# Patient Record
Sex: FEMALE | Race: BLACK OR AFRICAN AMERICAN | NOT HISPANIC OR LATINO | Employment: OTHER | URBAN - METROPOLITAN AREA
[De-identification: names, ages, dates, MRNs, and addresses within clinical notes are randomized per-mention and may not be internally consistent; named-entity substitution may affect disease eponyms.]

---

## 2017-09-05 ENCOUNTER — OFFICE VISIT (OUTPATIENT)
Dept: ORTHOPEDICS | Facility: CLINIC | Age: 67
End: 2017-09-05

## 2017-09-05 VITALS
SYSTOLIC BLOOD PRESSURE: 143 MMHG | HEIGHT: 62 IN | DIASTOLIC BLOOD PRESSURE: 77 MMHG | WEIGHT: 204.2 LBS | HEART RATE: 72 BPM | BODY MASS INDEX: 37.58 KG/M2

## 2017-09-05 DIAGNOSIS — M25.562 LEFT KNEE PAIN, UNSPECIFIED CHRONICITY: Primary | ICD-10-CM

## 2017-09-05 DIAGNOSIS — M17.12 PRIMARY OSTEOARTHRITIS OF LEFT KNEE: ICD-10-CM

## 2017-09-05 NOTE — LETTER
"  9/5/2017      RE: Tricia Dumas  [address] [place] [address]       Sports Medicine Clinic Visit    PCP: Jesica Grossman    Tricia Dumas is a 67 year old female who is seen  in consultation at the request of PCP Clinic presenting with Left knee pain .  Pt is on Coreg, Cozaar and Coumadin.  H/O nephrotic syndrome.  Previous workup, including workup including SHYANNE, dsDNA, ANCA, complement levels, workup for hemolysis, including LDH, reticulocyte count and peripheral blood smear were negative.  DM, HTN.  H/o  low back pain in the past. She was diagnosed with antiphospholipid antibody syndrome about 4 years back when she had a hysterectomy and developed a DVT and PE and has been on coumadin since then    Injury: N/A    Location of Pain: left knee  Duration of Pain: 3 year(s)  Rating of Pain: 5/10  Pain is better with: Nothing  Pain is worse with: Walking   Additional Features: N/A  Treatment so far consists of: Ice and Heat  Prior History of related problems: N/A    /77  Pulse 72  Ht 5' 1.5\" (1.562 m)  Wt 204 lb 3.2 oz (92.6 kg)  BMI 37.96 kg/m2       PMH:  Past Medical History:   Diagnosis Date     Hypertension      Nonsenile cataract      Type 2 diabetes mellitus without complications (H)        Active problem list:  Patient Active Problem List   Diagnosis     Essential hypertension     Microhematuria     Flank pain     Nephrotic syndrome     Proteinuria     Diabetes mellitus type 2, uncomplicated (H)     Hyperlipidemia       FH:  Family History   Problem Relation Age of Onset     Glaucoma No family hx of      Macular Degeneration No family hx of        SH:  Social History     Social History     Marital status:      Spouse name: N/A     Number of children: N/A     Years of education: N/A     Occupational History     Not on file.     Social History Main Topics     Smoking status: Never Smoker     Smokeless tobacco: Not on file     Alcohol use Not on file     Drug use: Not on file     Sexual " "activity: Not on file     Other Topics Concern     Not on file     Social History Narrative       MEDS:  See EMR, reviewed  ALL:  See EMR, reviewed    REVIEW OF SYSTEMS:  CONSTITUTIONAL:NEGATIVE for fever, chills, change in weight  INTEGUMENTARY/SKIN: NEGATIVE for worrisome rashes, moles or lesions  EYES: NEGATIVE for vision changes or irritation  ENT/MOUTH: NEGATIVE for ear, mouth and throat problems  RESP:NEGATIVE for significant cough or SOB  BREAST: NEGATIVE for masses, tenderness or discharge  CV: NEGATIVE for chest pain, palpitations or peripheral edema  GI: NEGATIVE for nausea, abdominal pain, heartburn, or change in bowel habits  :NEGATIVE for frequency, dysuria, or hematuria  :NEGATIVE for frequency, dysuria, or hematuria  NEURO: NEGATIVE for weakness, dizziness or paresthesias  ENDOCRINE: NEGATIVE for temperature intolerance, skin/hair changes  HEME/ALLERGY/IMMUNE: NEGATIVE for bleeding problems  PSYCHIATRIC: NEGATIVE for changes in mood or affect        Subjective: This 67-year-old native  female is seen with her family member and an  for chronic left-sided knee discomfort. It bothers her with weightbearing. This bothered her for nearly 2 years. She points to the medial and lateral aspects of the left knee as the area that gives her discomfort. It does not lock or catch. She indicates that 3 months ago and x-ray and MRI of the knee were done at the [facility].  She believes the results of the MRI were sent to her primary provider at [facility] clinic.  Unfortunately I do not have any of these results results today. She indicates that she was told that there was \"torn cartilage\" within the knee. She was told that she should discuss it with a knee specialist.    Objective: Above ideal weight. Normal range of motion the left hip to internal rotation and external rotation and abduction. There is no effusion of the left knee. I can flex and extend the knee fully. Nontender over the medial or " lateral joint line. Nontender over the patellar tendon representing bursa. She could do an active straight leg raise. Nontender over the distal IT band. Distal pulses and sensation are intact. Strength is intact to dorsiflexion of the foot and eversion at the foot. Active straight leg raise is negative. Overlying skin is normal. Appropriate in conversation and affect.      X-rays of the left knee today show medial joint space narrowing and peripheral spurring of a moderate degree.    Assessment: Left knee DJD.    Plan: We will try to get copies of her MRI report from [facility].  In the meantime we discussed conservative cares for degenerative arthritis of the knee and we went over x-rays in detail. She would like to avoid oral pain prescription medicines because of her history of kidney disease. She would like to avoid a cortisone or Synvisc injection for now. She knows is an option for the future if she changes her mind. She is considering having custom medial  brace made by orthotics but wants to avoid it for now. She does not want any interventions today but wants to think about her options. I told her would be best to avoid surgery in the current circumstances. There is no indication for knee replacement at this time. She would like to avoid surgery regardless.    Jhonny Terry MD

## 2017-09-05 NOTE — MR AVS SNAPSHOT
"              After Visit Summary   9/5/2017    Tricia Dumas    MRN: [id number]           Patient Information     Date Of Birth          1950        Visit Information        Provider Department      9/5/2017 8:30 AM Jhonny Terry MD; [employer] [facility] Sports Medicine        Today's Diagnoses     Left knee pain, unspecified chronicity    -  1    Primary osteoarthritis of left knee           Follow-ups after your visit        Who to contact     Please call your clinic at [phone] to:    Ask questions about your health    Make or cancel appointments    Discuss your medicines    Learn about your test results    Speak to your doctor   If you have compliments or concerns about an experience at your clinic, or if you wish to file a complaint, please contact [facility] Patient Relations at [phone] or email us at [email or web address]         Additional Information About Your Visit        MyChart Information     [employer] gives you secure access to your electronic health record. If you see a primary care provider, you can also send messages to your care team and make appointments. If you have questions, please call your primary care clinic.  If you do not have a primary care provider, please call [phone] and they will assist you.      [employer] is an electronic gateway that provides easy, online access to your medical records. With [employer], you can request a clinic appointment, read your test results, renew a prescription or communicate with your care team.     To access your existing account, please contact your [facility] or call [phone] for assistance.        Care EveryWhere ID     This is your Care EveryWhere ID. This could be used by other organizations to access your [place] medical records  [id number]        Your Vitals Were     Pulse Height BMI (Body Mass Index)             72 5' 1.5\" (1.562 m) 37.96 kg/m2   "        Blood Pressure from Last 3 Encounters:   09/05/17 143/77   02/26/16 145/81   01/22/16 143/73    Weight from Last 3 Encounters:   09/05/17 204 lb 3.2 oz (92.6 kg)   02/26/16 199 lb 8.3 oz (90.5 kg)   01/22/16 201 lb (91.2 kg)               Primary Care Provider Office Phone # Fax #    Jesica Grossman [phone]       [facility] [address]  [facility] [address]        Equal Access to Services     [facility] : Hadii triston mercado hadasho Sonoris, waaxda luqadaha, qaybta kaalmada frank, david davis . [facility] [phone].    ATENCIÓN: Si habla español, tiene a andres disposición servicios gratuitos de asistencia lingüística. [facility] [phone].    We comply with applicable federal civil rights laws and [place] laws. We do not discriminate on the basis of race, color, national origin, age, disability sex, sexual orientation or gender identity.            Thank you!     Thank you for choosing [facility]  for your care. Our goal is always to provide you with excellent care. Hearing back from our patients is one way we can continue to improve our services. Please take a few minutes to complete the written survey that you may receive in the mail after your visit with us. Thank you!             Your Updated Medication List - Protect others around you: Learn how to safely use, store and throw away your medicines at www.disposemymeds.org.          This list is accurate as of: 9/5/17  9:39 AM.  Always use your most recent med list.                   Brand Name Dispense Instructions for use Diagnosis    acetaminophen 500 MG tablet    TYLENOL     Take 1 tablet by mouth every 6 hours as needed.        calcium-vitamin D 600-400 MG-UNIT per tablet    CALTRATE     Take 1 tablet by mouth 2 times daily        carvedilol 12.5 MG tablet    COREG    60 tablet    Take 1 tablet (12.5 mg) by mouth 2 times daily    Hypertension       ketoconazole 2 % cream    NIZORAL      Apply topically daily        losartan 100 MG tablet    COZAAR    90 tablet    Take 1 tablet (100 mg) by mouth daily    Benign essential hypertension       omeprazole 20 MG CR capsule    priLOSEC    90 capsule    Take 2 capsules by mouth daily.    Esophageal reflux, Unspecified essential hypertension, Skin abnormalities, Myalgia, Flank pain       rosuvastatin 10 MG tablet    CRESTOR    60 tablet    Take 1 tablet (10 mg) by mouth daily    Hyperlipidemia LDL goal <100       warfarin 2.5 MG tablet    COUMADIN    90 tablet    Take 2.5 mg by mouth daily.    Esophageal reflux, Unspecified essential hypertension, Skin abnormalities, Myalgia, Flank pain

## 2017-09-05 NOTE — PROGRESS NOTES
"Sports Medicine Clinic Visit    PCP: Jesica Grossman    Tricia Dumas is a 67 year old female who is seen  in consultation at the request of PCP Clinic presenting with Left knee pain .  Pt is on Coreg, Cozaar and Coumadin.  H/O nephrotic syndrome.  Previous workup, including workup including SHYANNE, dsDNA, ANCA, complement levels, workup for hemolysis, including LDH, reticulocyte count and peripheral blood smear were negative.  DM, HTN.  H/o  low back pain in the past. She was diagnosed with antiphospholipid antibody syndrome about 4 years back when she had a hysterectomy and developed a DVT and PE and has been on coumadin since then    Injury: N/A    Location of Pain: left knee  Duration of Pain: 3 year(s)  Rating of Pain: 5/10  Pain is better with: Nothing  Pain is worse with: Walking   Additional Features: N/A  Treatment so far consists of: Ice and Heat  Prior History of related problems: N/A    /77  Pulse 72  Ht 5' 1.5\" (1.562 m)  Wt 204 lb 3.2 oz (92.6 kg)  BMI 37.96 kg/m2       PMH:  Past Medical History:   Diagnosis Date     Hypertension      Nonsenile cataract      Type 2 diabetes mellitus without complications (H)        Active problem list:  Patient Active Problem List   Diagnosis     Essential hypertension     Microhematuria     Flank pain     Nephrotic syndrome     Proteinuria     Diabetes mellitus type 2, uncomplicated (H)     Hyperlipidemia       FH:  Family History   Problem Relation Age of Onset     Glaucoma No family hx of      Macular Degeneration No family hx of        SH:  Social History     Social History     Marital status:      Spouse name: N/A     Number of children: N/A     Years of education: N/A     Occupational History     Not on file.     Social History Main Topics     Smoking status: Never Smoker     Smokeless tobacco: Not on file     Alcohol use Not on file     Drug use: Not on file     Sexual activity: Not on file     Other Topics Concern     Not on file     Social History " "Narrative       MEDS:  See EMR, reviewed  ALL:  See EMR, reviewed    REVIEW OF SYSTEMS:  CONSTITUTIONAL:NEGATIVE for fever, chills, change in weight  INTEGUMENTARY/SKIN: NEGATIVE for worrisome rashes, moles or lesions  EYES: NEGATIVE for vision changes or irritation  ENT/MOUTH: NEGATIVE for ear, mouth and throat problems  RESP:NEGATIVE for significant cough or SOB  BREAST: NEGATIVE for masses, tenderness or discharge  CV: NEGATIVE for chest pain, palpitations or peripheral edema  GI: NEGATIVE for nausea, abdominal pain, heartburn, or change in bowel habits  :NEGATIVE for frequency, dysuria, or hematuria  :NEGATIVE for frequency, dysuria, or hematuria  NEURO: NEGATIVE for weakness, dizziness or paresthesias  ENDOCRINE: NEGATIVE for temperature intolerance, skin/hair changes  HEME/ALLERGY/IMMUNE: NEGATIVE for bleeding problems  PSYCHIATRIC: NEGATIVE for changes in mood or affect        Subjective: This 67-year-old native  female is seen with her family member and an  for chronic left-sided knee discomfort. It bothers her with weightbearing. This bothered her for nearly 2 years. She points to the medial and lateral aspects of the left knee as the area that gives her discomfort. It does not lock or catch. She indicates that 3 months ago and x-ray and MRI of the knee were done at the [facility].  She believes the results of the MRI were sent to her primary provider at [facility] clinic.  Unfortunately I do not have any of these results results today. She indicates that she was told that there was \"torn cartilage\" within the knee. She was told that she should discuss it with a knee specialist.    Objective: Above ideal weight. Normal range of motion the left hip to internal rotation and external rotation and abduction. There is no effusion of the left knee. I can flex and extend the knee fully. Nontender over the medial or lateral joint line. Nontender over the patellar tendon representing bursa. She " could do an active straight leg raise. Nontender over the distal IT band. Distal pulses and sensation are intact. Strength is intact to dorsiflexion of the foot and eversion at the foot. Active straight leg raise is negative. Overlying skin is normal. Appropriate in conversation and affect.      X-rays of the left knee today show medial joint space narrowing and peripheral spurring of a moderate degree.    Assessment: Left knee DJD.    Plan: We will try to get copies of her MRI report from [facility].  In the meantime we discussed conservative cares for degenerative arthritis of the knee and we went over x-rays in detail. She would like to avoid oral pain prescription medicines because of her history of kidney disease. She would like to avoid a cortisone or Synvisc injection for now. She knows is an option for the future if she changes her mind. She is considering having custom medial  brace made by orthotics but wants to avoid it for now. She does not want any interventions today but wants to think about her options. I told her would be best to avoid surgery in the current circumstances. There is no indication for knee replacement at this time. She would like to avoid surgery regardless.

## 2020-03-02 ENCOUNTER — HEALTH MAINTENANCE LETTER (OUTPATIENT)
Age: 70
End: 2020-03-02

## 2020-12-14 ENCOUNTER — HEALTH MAINTENANCE LETTER (OUTPATIENT)
Age: 70
End: 2020-12-14

## 2021-01-26 ENCOUNTER — TRANSCRIBE ORDERS (OUTPATIENT)
Dept: OTHER | Age: 71
End: 2021-01-26

## 2021-01-26 DIAGNOSIS — N02.2 MEMBRANOUS NEPHROPATHY DETERMINED BY BIOPSY: Primary | ICD-10-CM

## 2021-01-27 ENCOUNTER — APPOINTMENT (OUTPATIENT)
Dept: INTERPRETER SERVICES | Facility: CLINIC | Age: 71
End: 2021-01-27
Payer: COMMERCIAL

## 2021-01-27 NOTE — TELEPHONE ENCOUNTER
RECORDS RECEIVED FROM:    DATE RECEIVED: 03.25.2021   NOTES STATUS DETAILS   OFFICE NOTE from referring provider N/A    OFFICE NOTE from other specialist  Care Everywhere 01.25.2021 Jesica Grossman MD      01.14.2021 [facility]  MD DEVON      01.12.2021 Afsaneh Gutierrez MD  [facility]     *Only VASCULITIS or LUPUS gather office notes for the following N/A    *PULMONARY   N/A    *ENT N/A    *DERMATOLOGY N/A    *RHEUMATOLOGY N/A    DISCHARGE SUMMARY from hospital N/A    DISCHARGE REPORT from the ER N/A    MEDICATION LIST Care Everywhere    IMAGING  (NEED IMAGES AND REPORTS)     KIDNEY CT SCAN N/A    KIDNEY ULTRASOUND Care Everywhere 11.19.2020 US Abd Complete     MR ABDOMEN N/A    NUCLEAR MEDICINE RENAL N/A    LABS     CBC Care Everywhere 12.10.2020   CMP Care Everywhere 12.10.2020   BMP N/A    UA Care Everywhere 01.07.2021   URINE PROTEIN Care Everywhere 01.07.2021   RENAL PANEL N/A    BIOPSY     KIDNEY BIOPSY  N/A       Action 02.17.2021    Action Taken Called [employer] to get image pushed over, called [phone], image updated to chart.

## 2021-02-22 ENCOUNTER — DOCUMENTATION ONLY (OUTPATIENT)
Dept: CARE COORDINATION | Facility: CLINIC | Age: 71
End: 2021-02-22

## 2021-03-25 ENCOUNTER — VIRTUAL VISIT (OUTPATIENT)
Dept: NEPHROLOGY | Facility: CLINIC | Age: 71
End: 2021-03-25
Attending: INTERNAL MEDICINE
Payer: COMMERCIAL

## 2021-03-25 ENCOUNTER — PRE VISIT (OUTPATIENT)
Dept: NEPHROLOGY | Facility: CLINIC | Age: 71
End: 2021-03-25

## 2021-03-25 DIAGNOSIS — E78.5 HYPERLIPIDEMIA LDL GOAL <100: ICD-10-CM

## 2021-03-25 DIAGNOSIS — N02.2 MEMBRANOUS NEPHROPATHY DETERMINED BY BIOPSY: Primary | ICD-10-CM

## 2021-03-25 PROCEDURE — 99205 OFFICE O/P NEW HI 60 MIN: CPT | Mod: 95 | Performed by: INTERNAL MEDICINE

## 2021-03-25 RX ORDER — GLIPIZIDE 5 MG/1
5 TABLET ORAL 2 TIMES DAILY
COMMUNITY

## 2021-03-25 RX ORDER — HYDROCHLOROTHIAZIDE 25 MG/1
25 TABLET ORAL DAILY
COMMUNITY

## 2021-03-25 RX ORDER — ROSUVASTATIN CALCIUM 5 MG/1
5 TABLET, COATED ORAL DAILY
Qty: 30 TABLET | Refills: 11 | Status: SHIPPED | OUTPATIENT
Start: 2021-03-25

## 2021-03-25 SDOH — HEALTH STABILITY: MENTAL HEALTH: HOW OFTEN DO YOU HAVE A DRINK CONTAINING ALCOHOL?: NEVER

## 2021-03-25 SDOH — HEALTH STABILITY: MENTAL HEALTH: HOW OFTEN DO YOU HAVE 6 OR MORE DRINKS ON ONE OCCASION?: NEVER

## 2021-03-25 SDOH — HEALTH STABILITY: MENTAL HEALTH: HOW MANY STANDARD DRINKS CONTAINING ALCOHOL DO YOU HAVE ON A TYPICAL DAY?: NOT ASKED

## 2021-03-25 ASSESSMENT — PAIN SCALES - GENERAL: PAINLEVEL: NO PAIN (0)

## 2021-03-25 NOTE — PROGRESS NOTES
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  Last seen by him in March 2016.  She has a history of nephrotic syndrome, with UPCR ~ 5 g/g.  She had a serologic work up including SHYANNE, dsDNA, ANCA, anti-GBM complements, and work up for hemolysis which were negative.     Her anti-PLA2r came back positive at 1:160.       She has a history of diabetes, but it was apparently diet controlled.    IN the interval since March 2016:  - She was initially hesitant to undergo a kidney biopsy, but it eventually had a CT guided biosy on 9/27/2016 at [facility], the result of which was consistent with membranous nephropathy (see report below).   - Serum and Urine ELP and urine IFX were neg in 12/22/2020.  - SHe has been followed by Dr Jaguar Astudillo, and her course is summarized as follows:  [...]'She was treated briefly with CSA and prednisone, but did not tolerate this and she stopped the medications on her own. Her BS's were very high. She was not happy with her care and came to see Dr. Matthews here, had spontaneous remission of proteinuria despite being off medications beside losartan.   I saw her in 2019 and she was doing well, had minimal proteinuria, BP controlled on losartan 50mg per day, and we planned yearly follow-up.   In December of 2019 she developed some L low back pain. She had labs taken by PCP, Cr remained normal but UA was positive for protein and I was notified. We got additional labs, found that her urine Pr:Cr was up to 1 g/g and she reported higher BP's. PLA2R was positive at 44. We increased losartan to 100 mg/day. Her last urine Pr:Cr " "was then 1.6 g/g on HCTZ 25mg and losartan 100mg daily.  [...]\"  Regarding her membranous nephropathy, her Cr continues to be normal, her PLA2R has edgar stable overall and was 53 in Dec 2020 from 61 in Sept 2020 and her urine Pr:Cr was 1.6 g/g this month compared to 3.47 g/g last month and this is lower than it has been in quite some time. \"    Today, She is seen by video, she is accompanied by her daughter who is fluent in English.  She is seeking a second opinion.  She has lower abdominal pain, and is concerned about theincreased proteinuria and the damage it may cause her kidneys.  She has L Ext swelling, especially the L leg, which gets betted with elevation.  The Left swellin g> R is not new.  The swelling is bad enough that she can see it.  Daughter Karma is a nurse. Estimates it at 1+  No SOB, CP, Cough.   SOme occasional N and ABd pain thought to be related to gas.  No Diarrhea.  Rash on the face itchy,  Palpable but not easy to see,  On cheeks and a bit on the forehead.  Intermittent.  Intermitted joint ache, no severe pain.    Diabetes:  On glipizide + Januvia.   BS checked at home 2 x day, 150-170 in morning and in evening, but it does go up > 200.    She is on coumadin for post surgical (hysterectomy) DVT and PE since 2011.  INR has been fluctuation.  NO Afib.  NO recurrent PE.    PAST MEDICAL HISTORY:  Past Medical History:   Diagnosis Date     Hypertension      Nonsenile cataract      Type 2 diabetes mellitus without complications (H)      PAST SURGICAL HISTORY:  No past surgical history on file.   S/p hysterectomy 2011  L eye laser + glaucoma  Cataract removal bilateral  (2019)    MEDICATIONS:  Prescription Medications reviewed by me on 3/25/2021       Rx Number Disp Refills Start End Last Dispensed Date Next Fill Date Owning Pharmacy    acetaminophen (TYLENOL) 500 MG tablet            Sig: Take 1 tablet by mouth every 6 hours as needed.    Class: Historical    Route: Oral    calcium-vitamin D " (CALTRATE) 600-400 MG-UNIT per tablet            Sig: Take 1 tablet by mouth 2 times daily    Class: Historical    Route: Oral    carvedilol (COREG) 12.5 MG tablet  60 tablet 6 2/12/2016    [id number] - [place], [place] - [address]    Sig: Take 1 tablet (12.5 mg) by mouth 2 times daily    Class: E-Prescribe    Route: Oral    ketoconazole (NIZORAL) 2 % cream            Sig: Apply topically daily    Class: Historical    Route: Topical    losartan (COZAAR) 100 MG tablet  90 tablet 1 4/19/2016    [id number] - [place], [place] - [address]    Sig: Take 1 tablet (100 mg) by mouth daily    Class: E-Prescribe    Route: Oral    omeprazole (PRILOSEC) 20 MG capsule  90 capsule 3 7/13/2012        Sig: Take 2 capsules by mouth daily.    Class: Historical    Route: Oral    rosuvastatin (CRESTOR) 10 MG tablet  60 tablet 1 1/22/2016    [id number] - [place], [place] - [address]    Sig: Take 1 tablet (10 mg) by mouth daily    Class: E-Prescribe    Route: Oral.   DOES NOT TAKE,  She never took.    warfarin (COUMADIN) 2.5 MG tablet  90 tablet 2 7/13/2012        Sig: Take 2.5 mg by mouth daily.  Takes 7.5 MWF a week, and 5 mg the other days    Class: Historical    Route: Oral    Tanaprost ophthal sol         ALLERGIES:    No Known Allergies  REVIEW OF SYSTEMS:  A comprehensive review of systems was performed and found to be negative except as described here or above.  SOCIAL HISTORY:   Social History     Socioeconomic History     Marital status:      Spouse name: Not on file     Number of children: Not on file     Years of education: Not on file     Highest education level: Not on file   Occupational History     Not on file   Social Needs     Financial resource strain: Not on file     Food insecurity     Worry: Not on file     Inability: Not on file     Transportation needs     Medical: Not on file     Non-medical: Not on file   Tobacco Use     Smoking status: Never Smoker    Substance and Sexual Activity     Alcohol use: Not on file     Drug use: Not on file     Sexual activity: Not on file   Lifestyle     Physical activity     Days per week: Not on file     Minutes per session: Not on file     Stress: Not on file   Relationships     Social connections     Talks on phone: Not on file     Gets together: Not on file     Attends Shinto service: Not on file     Active member of club or organization: Not on file     Attends meetings of clubs or organizations: Not on file     Relationship status: Not on file     Intimate partner violence     Fear of current or ex partner: Not on file     Emotionally abused: Not on file     Physically abused: Not on file     Forced sexual activity: Not on file   Other Topics Concern     Not on file   Social History Narrative     Not on file     FAMILY MEDICAL HISTORY:   Family History   Problem Relation Age of Onset     Glaucoma No family hx of      Macular Degeneration No family hx of      PHYSICAL EXAM:   There were no vitals taken for this visit.   Today 121/81, P 88 O2 98%  GENERAL APPEARANCE: alert and no distress  EYES: nonicteric  RESP: breathing not labored   Extremities: 1+ edema per daughter  NEURO: mentation intact and speech normal  PSYCH: affect normal/bright   LABS:   No results found for this or any previous visit (from the past 672 hour(s)).  CMP  Recent Labs   Lab Test 02/26/16  1314 01/22/16  1201 11/20/15  1415 11/03/15  1551 10/02/15  1434    140 140 138 137   POTASSIUM 4.0 3.9 3.6 4.3 4.2   CHLORIDE 104 106 103 102 104   CO2 29 29 29 32 32   ANIONGAP 7 5 7 4 2*   GLC 87 130* 119* 78 85   BUN 9 12 14 13 15   CR 0.62 0.68 0.81 0.75 0.64   GFRESTIMATED >90  Non  GFR Calc   86 71 78 >90  Non  GFR Calc     GFRESTBLACK >90   GFR Calc   >90   GFR Calc   86 >90   GFR Calc   >90   GFR Calc     KIMBERLY 8.6 8.6 8.7 9.1 8.8   PHOS 3.0 3.0  --  3.9  2.6   PROTTOTAL  --   --  7.0  --   --    ALBUMIN 2.3* 2.2* 2.6* 2.7* 2.6*   BILITOTAL  --   --  0.2  --   --    ALKPHOS  --   --  70  --   --    AST  --   --  22  --   --    ALT  --   --  24  --   --      CBC  Recent Labs   Lab Test 02/26/16  1314 01/22/16  1201 11/20/15  1415 10/02/15  1434   HGB 13.3 12.8 14.4 13.9   WBC  --   --  5.3 5.3   RBC  --   --  4.65 4.57   HCT  --   --  42.4 41.6   MCV  --   --  91 91   MCH  --   --  31.0 30.4   MCHC  --   --  34.0 33.4   RDW  --   --  13.5 13.8   PLT  --   --  257 266     INRNo lab results found.  ABGNo lab results found.   URINE STUDIES  Recent Labs   Lab Test 01/22/16  1221 10/15/15  1114 10/02/15  1436   COLOR Light Yellow Yellow Light Yellow   APPEARANCE Clear Clear Clear   URINEGLC Negative Negative Negative   URINEBILI Negative Negative Negative   URINEKETONE Negative Negative Negative   SG 1.001* 1.020 1.005   UBLD Small* Small* Small*   URINEPH 6.5 8.0* 6.5   PROTEIN 30* 300* 100*   NITRITE Negative Negative Negative   LEUKEST Negative Negative Negative   RBCU <1 11* <1   WBCU 0 1 1     Recent Labs   Lab Test 02/26/16  1315 01/22/16  1221 11/03/15  1542 10/15/15  1114 10/02/15  1436   UTPG 5.45* 5.08* 2.15* 1.93* 5.45*                     Care Everywhere Result Report  Surgical PathResulted: 10/10/2016 2:29 PM  [facility]  Component Name Value Ref Range   Histology (NOTE)   Surgical Final Report   Patient Name: LESA ROLDAN   Accession #:[id number]   Taken: 9/27/2016   Received: 9/27/2016   Reported: 10/10/2016   Physician(s): MONICO LOPEZ                      Final Pathologic Diagnosis   This case was [facility] [address] [place] [address].  Dr. Anika Carrillo rendered the diagnosis   and interpretation of this case.  The following is their report:   [facility] # [id number]   Kidney, left, biopsy  Membranous glomerulopathy, stage 2   Report electronically signed by:   Anika MACKENZIE  "EDUAR Carrillo   09/29/16   Dr. Walls at [facility] has not reviewed these slides and only electronically signed out the report.    *Electronically Signed Out By Aurelia Walls MD*   Procedures/Addenda   Clinical History   The patient is a 66- year- old female hospital outpatient who has   nephrotic syndrome with hematuria and hypertension; possibly   membranous nephropathy. An anti-THOMAS 2R was elevated, and conservative therapy with an angiotensin receptor blocker was attempted for over six months. There has been no significant anti-proteinuric response.   Laboratory Data, 8/29/16: Creatinine - 0.70, BUN- 12, EGFR- >60.    [...]  Microscopic Description   The specimen was evaluated utilizing H and E, trichrome, PAS and Helms   silver stains. Thirty three glomeruli are identified; one appears   globally sclerosed. The glomeruli show mild thickening of glomerular capillary loops and mild mesangial matrix expansion; they appear normocellular. Helms' silver-stained sections reveal small silver-negative deposits and small silver positive \"spikes\" between the deposits. There is no evidence of inflammation. The tubules, interstitium, arteries and arterioles appear normal.   [...]   Immunofluorescence:   Eight glomeruli are identified. There is diffuse, granular glomerular capillary loop immunoreactivity with antibodies to IgG (4+) , C3 (4+) and kappa/lambda light chains (3+) Possible mesangial immunoreactivity is identified, as well . There is no evidence of immunoreactivity with antibodies to IgA, IgM, C1q, albumin or fibrinogen.   The above findings are graded on a scale of one to four.    Electron Microscopy:   Description:   Plastic Embedded Thick Sections:   Five Methylene Blue - Bristow II stained slides are examined. In total, nine glomeruli are identified. Three glomeruli are identified on block -3. The glomeruli show mild thickening of glomerular capillary loops. The tubules and interstitium show no " abnormalities.   Electron Microscopic Study:   Representative electron micrographs reveal subepithelial and   intramembranous electron dense deposits. The glomerular capillary walls are moderately thickened. There is diffuse effacement of visceral epithelial cell foot processes. There is mild expansion of the lamina leah interna. Mesangial areas appear mildly expanded with occasional electron dense deposits. Tubules and interstitium appear normal.    eliza/10/10/2016   [facility]   Department of Pathology   [address] [place] [address]  [id number]   Tel: [phone]        ASSESSMENT AND PLAN:   Mrs Dumas has an established history of membranous nephropathy which is anti-PLA2r positive and confirmed by renal biopsy.  She had severe hyperglycemia with treatment with Cyclospotine + prednisone leading to discontinuation.  She appears to have subsequently had a partial remission with sarah UPCR ~ 1 g/g.   However, the most recent labs show an increase in UPCR to > 4 g/g, and worsening of hyperlipidemia.  THere is also a small, but significant increase in Cr from 0.8 to 1.0.   Recent work up for monoclonal gammopathy does NOT suggest the presence of one.    At this point, we discussed:  1- repeat labs, including repeat viral serologies and quant gold in preparation for immunosuppressive therapy.  2- start rosuvastatin 5 mg daily.  I explained that the hyperlipidema is related to the membranous nephropathy and is unlikely to resolve with diet alone.  3- Mrs Dumas's daughter Karma, will arrange for her to be vaccinated for COVID prior to starting immunosuppression.  4- I described the treatment with rituximab for MN.  Mrs Dumas and her daughter voiced understanding and are agreeable with the plan.      Jhonny Martin MD  Division of Renal Disease and Hypertension  March 25, 2021  10:59 AM      Tricia is a 71 year old who is being evaluated via a billable video visit.      How would you like to obtain your AVS? Mail  a copy  If the video visit is dropped, the invitation should be resent by: Text to cell phone: [phone]  Will anyone else be joining your video visit? Daughter Karma (nurse)      Video Start Time: 2:27 PM  Video-Visit Details    Type of service:  Video Visit    Video End Time:3:28 PM    Originating Location (pt. Location): Home    Distant Location (provider location):  [facility] NEPHROLOGY [facility]     Platform used for Video Visit: [employer]

## 2021-03-25 NOTE — LETTER
"3/25/2021       RE: Tricia Dumas  [address]  [facility] [address]     Dear Colleague,    Thank you for referring your patient, Tricia Dumas, to the [facility] NEPHROLOGY [facility]INIC [place] at [facility]. Please see a copy of my visit note below.    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  Last seen by him in March 2016.  She has a history of nephrotic syndrome, with UPCR ~ 5 g/g.  She had a serologic work up including SHYANNE, dsDNA, ANCA, anti-GBM complements, and work up for hemolysis which were negative.     Her anti-PLA2r came back positive at 1:160.       She has a history of diabetes, but it was apparently diet controlled.    IN the interval since March 2016:  - She was initially hesitant to undergo a kidney biopsy, but it eventually had a CT guided biosy on 9/27/2016 at [facility], the result of which was consistent with membranous nephropathy (see report below).   - Serum and Urine ELP and urine IFX were neg in 12/22/2020.  - SHe has been followed by Dr Jaguar Astudillo, and her course is summarized as follows:  [...]'She was treated briefly with CSA and prednisone, but did not tolerate this and she stopped the medications on her own. Her BS's were very high. She was not happy with her care and came to see Dr. Matthews here, had spontaneous remission of proteinuria despite being off medications beside losartan.   I saw her in 2019 and she was doing well, had minimal proteinuria, BP controlled on losartan 50mg per day, and we planned yearly follow-up.   In December of " "2019 she developed some L low back pain. She had labs taken by PCP, Cr remained normal but UA was positive for protein and I was notified. We got additional labs, found that her urine Pr:Cr was up to 1 g/g and she reported higher BP's. PLA2R was positive at 44. We increased losartan to 100 mg/day. Her last urine Pr:Cr was then 1.6 g/g on HCTZ 25mg and losartan 100mg daily.  [...]\"  Regarding her membranous nephropathy, her Cr continues to be normal, her PLA2R has edgar stable overall and was 53 in Dec 2020 from 61 in Sept 2020 and her urine Pr:Cr was 1.6 g/g this month compared to 3.47 g/g last month and this is lower than it has been in quite some time. \"    Today, She is seen by video, she is accompanied by her daughter who is fluent in English.  She is seeking a second opinion.  She has lower abdominal pain, and is concerned about theincreased proteinuria and the damage it may cause her kidneys.  She has L Ext swelling, especially the L leg, which gets betted with elevation.  The Left swellin g> R is not new.  The swelling is bad enough that she can see it.  Daughter Karma is a nurse. Estimates it at 1+  No SOB, CP, Cough.   SOme occasional N and ABd pain thought to be related to gas.  No Diarrhea.  Rash on the face itchy,  Palpable but not easy to see,  On cheeks and a bit on the forehead.  Intermittent.  Intermitted joint ache, no severe pain.    Diabetes:  On glipizide + Januvia.   BS checked at home 2 x day, 150-170 in morning and in evening, but it does go up > 200.    She is on coumadin for post surgical (hysterectomy) DVT and PE since 2011.  INR has been fluctuation.  NO Afib.  NO recurrent PE.    PAST MEDICAL HISTORY:  Past Medical History:   Diagnosis Date     Hypertension      Nonsenile cataract      Type 2 diabetes mellitus without complications (H)      PAST SURGICAL HISTORY:  No past surgical history on file.   S/p hysterectomy 2011  L eye laser + glaucoma  Cataract removal bilateral  " (2019)    MEDICATIONS:  Prescription Medications reviewed by me on 3/25/2021       Rx Number Disp Refills Start End Last Dispensed Date Next Fill Date Owning Pharmacy    acetaminophen (TYLENOL) 500 MG tablet            Sig: Take 1 tablet by mouth every 6 hours as needed.    Class: Historical    Route: Oral    calcium-vitamin D (CALTRATE) 600-400 MG-UNIT per tablet            Sig: Take 1 tablet by mouth 2 times daily    Class: Historical    Route: Oral    carvedilol (COREG) 12.5 MG tablet  60 tablet 6 2/12/2016    [id number] - [place], [place] - [address]    Sig: Take 1 tablet (12.5 mg) by mouth 2 times daily    Class: E-Prescribe    Route: Oral    ketoconazole (NIZORAL) 2 % cream            Sig: Apply topically daily    Class: Historical    Route: Topical    losartan (COZAAR) 100 MG tablet  90 tablet 1 4/19/2016    [id number] - [place], [place] - [address]    Sig: Take 1 tablet (100 mg) by mouth daily    Class: E-Prescribe    Route: Oral    omeprazole (PRILOSEC) 20 MG capsule  90 capsule 3 7/13/2012        Sig: Take 2 capsules by mouth daily.    Class: Historical    Route: Oral    rosuvastatin (CRESTOR) 10 MG tablet  60 tablet 1 1/22/2016    [id number] - [place], [place] - [address]    Sig: Take 1 tablet (10 mg) by mouth daily    Class: E-Prescribe    Route: Oral.   DOES NOT TAKE,  She never took.    warfarin (COUMADIN) 2.5 MG tablet  90 tablet 2 7/13/2012        Sig: Take 2.5 mg by mouth daily.  Takes 7.5 MWF a week, and 5 mg the other days    Class: Historical    Route: Oral    Tanaprost ophthal sol         ALLERGIES:    No Known Allergies  REVIEW OF SYSTEMS:  A comprehensive review of systems was performed and found to be negative except as described here or above.  SOCIAL HISTORY:   Social History     Socioeconomic History     Marital status:      Spouse name: Not on file     Number of children: Not on file     Years of education: Not on file      Highest education level: Not on file   Occupational History     Not on file   Social Needs     Financial resource strain: Not on file     Food insecurity     Worry: Not on file     Inability: Not on file     Transportation needs     Medical: Not on file     Non-medical: Not on file   Tobacco Use     Smoking status: Never Smoker   Substance and Sexual Activity     Alcohol use: Not on file     Drug use: Not on file     Sexual activity: Not on file   Lifestyle     Physical activity     Days per week: Not on file     Minutes per session: Not on file     Stress: Not on file   Relationships     Social connections     Talks on phone: Not on file     Gets together: Not on file     Attends Rastafarian service: Not on file     Active member of club or organization: Not on file     Attends meetings of clubs or organizations: Not on file     Relationship status: Not on file     Intimate partner violence     Fear of current or ex partner: Not on file     Emotionally abused: Not on file     Physically abused: Not on file     Forced sexual activity: Not on file   Other Topics Concern     Not on file   Social History Narrative     Not on file     FAMILY MEDICAL HISTORY:   Family History   Problem Relation Age of Onset     Glaucoma No family hx of      Macular Degeneration No family hx of      PHYSICAL EXAM:   There were no vitals taken for this visit.   Today 121/81, P 88 O2 98%  GENERAL APPEARANCE: alert and no distress  EYES: nonicteric  RESP: breathing not labored   Extremities: 1+ edema per daughter  NEURO: mentation intact and speech normal  PSYCH: affect normal/bright   LABS:   No results found for this or any previous visit (from the past 672 hour(s)).  CMP  Recent Labs   Lab Test 02/26/16  1314 01/22/16  1201 11/20/15  1415 11/03/15  1551 10/02/15  1434    140 140 138 137   POTASSIUM 4.0 3.9 3.6 4.3 4.2   CHLORIDE 104 106 103 102 104   CO2 29 29 29 32 32   ANIONGAP 7 5 7 4 2*   GLC 87 130* 119* 78 85   BUN 9 12 14 13 15    CR 0.62 0.68 0.81 0.75 0.64   GFRESTIMATED >90  Non  GFR Calc   86 71 78 >90  Non  GFR Calc     GFRESTBLACK >90   GFR Calc   >90   GFR Calc   86 >90   GFR Calc   >90   GFR Calc     KIMBERLY 8.6 8.6 8.7 9.1 8.8   PHOS 3.0 3.0  --  3.9 2.6   PROTTOTAL  --   --  7.0  --   --    ALBUMIN 2.3* 2.2* 2.6* 2.7* 2.6*   BILITOTAL  --   --  0.2  --   --    ALKPHOS  --   --  70  --   --    AST  --   --  22  --   --    ALT  --   --  24  --   --      CBC  Recent Labs   Lab Test 02/26/16  1314 01/22/16  1201 11/20/15  1415 10/02/15  1434   HGB 13.3 12.8 14.4 13.9   WBC  --   --  5.3 5.3   RBC  --   --  4.65 4.57   HCT  --   --  42.4 41.6   MCV  --   --  91 91   MCH  --   --  31.0 30.4   MCHC  --   --  34.0 33.4   RDW  --   --  13.5 13.8   PLT  --   --  257 266     INRNo lab results found.  ABGNo lab results found.   URINE STUDIES  Recent Labs   Lab Test 01/22/16  1221 10/15/15  1114 10/02/15  1436   COLOR Light Yellow Yellow Light Yellow   APPEARANCE Clear Clear Clear   URINEGLC Negative Negative Negative   URINEBILI Negative Negative Negative   URINEKETONE Negative Negative Negative   SG 1.001* 1.020 1.005   UBLD Small* Small* Small*   URINEPH 6.5 8.0* 6.5   PROTEIN 30* 300* 100*   NITRITE Negative Negative Negative   LEUKEST Negative Negative Negative   RBCU <1 11* <1   WBCU 0 1 1     Recent Labs   Lab Test 02/26/16  1315 01/22/16  1221 11/03/15  1542 10/15/15  1114 10/02/15  1436   UTPG 5.45* 5.08* 2.15* 1.93* 5.45*                     Care Everywhere Result Report  Surgical PathResulted: 10/10/2016 2:29 PM  [facility]  Component Name Value Ref Range   Histology (NOTE)   Surgical Final Report   Patient Name: LESA ROLDAN   Accession #:[id number]   Taken: 9/27/2016   Received: 9/27/2016   Reported: 10/10/2016   Physician(s): MONICO LOPEZ                      Final Pathologic Diagnosis   This case was sent to  "[facility] [address] [place] [address]., [place], [place].  Dr. Anika Carrillo rendered the diagnosis   and interpretation of this case.  The following is their report:   [facility] Accession # [id number]   Kidney, left, biopsy  Membranous glomerulopathy, stage 2   Report electronically signed by:   Anika Carrillo M.D.   09/29/16   Dr. Walls at [facility] has not reviewed these slides and only electronically signed out the report.    *Electronically Signed Out By Aurelia Walls MD*   Procedures/Addenda   Clinical History   The patient is a 66- year- old female hospital outpatient who has   nephrotic syndrome with hematuria and hypertension; possibly   membranous nephropathy. An anti-THOMAS 2R was elevated, and conservative therapy with an angiotensin receptor blocker was attempted for over six months. There has been no significant anti-proteinuric response.   Laboratory Data, 8/29/16: Creatinine - 0.70, BUN- 12, EGFR- >60.    [...]  Microscopic Description   The specimen was evaluated utilizing H and E, trichrome, PAS and Helms   silver stains. Thirty three glomeruli are identified; one appears   globally sclerosed. The glomeruli show mild thickening of glomerular capillary loops and mild mesangial matrix expansion; they appear normocellular. Helms' silver-stained sections reveal small silver-negative deposits and small silver positive \"spikes\" between the deposits. There is no evidence of inflammation. The tubules, interstitium, arteries and arterioles appear normal.   [...]   Immunofluorescence:   Eight glomeruli are identified. There is diffuse, granular glomerular capillary loop immunoreactivity with antibodies to IgG (4+) , C3 (4+) and kappa/lambda light chains (3+) Possible mesangial immunoreactivity is identified, as well . There is no evidence of immunoreactivity with antibodies to IgA, IgM, C1q, albumin or fibrinogen.   The above findings are graded on a scale of one to four.    Electron " Microscopy:   Description:   Plastic Embedded Thick Sections:   Five Methylene Blue - Jackie II stained slides are examined. In total, nine glomeruli are identified. Three glomeruli are identified on block -8. The glomeruli show mild thickening of glomerular capillary loops. The tubules and interstitium show no abnormalities.   Electron Microscopic Study:   Representative electron micrographs reveal subepithelial and   intramembranous electron dense deposits. The glomerular capillary walls are moderately thickened. There is diffuse effacement of visceral epithelial cell foot processes. There is mild expansion of the lamina leah interna. Mesangial areas appear mildly expanded with occasional electron dense deposits. Tubules and interstitium appear normal.    eliza/10/10/2016   [facility]   Department of Pathology   [address] [place] [address]   Tel: [phone]        ASSESSMENT AND PLAN:   Mrs Dumas has an established history of membranous nephropathy which is anti-PLA2r positive and confirmed by renal biopsy.  She had severe hyperglycemia with treatment with Cyclospotine + prednisone leading to discontinuation.  She appears to have subsequently had a partial remission with sarah UPCR ~ 1 g/g.   However, the most recent labs show an increase in UPCR to > 4 g/g, and worsening of hyperlipidemia.  THere is also a small, but significant increase in Cr from 0.8 to 1.0.   Recent work up for monoclonal gammopathy does NOT suggest the presence of one.    At this point, we discussed:  1- repeat labs, including repeat viral serologies and quant gold in preparation for immunosuppressive therapy.  2- start rosuvastatin 5 mg daily.  I explained that the hyperlipidema is related to the membranous nephropathy and is unlikely to resolve with diet alone.  3- Mrs Dumas's daughter Karma, will arrange for her to be vaccinated for COVID prior to starting immunosuppression.  4- I described the treatment with  rituximab for [place].  Mrs Dumas and her daughter voiced understanding and are agreeable with the plan.      Jhonny Martin MD  Division of Renal Disease and Hypertension  March 25, 2021  10:59 AM      Tricia is a 71 year old who is being evaluated via a billable video visit.      How would you like to obtain your AVS? Mail a copy  If the video visit is dropped, the invitation should be resent by: Text to cell phone: [phone]  Will anyone else be joining your video visit? Daughter Karma (nurse)      Video Start Time: 2:27 PM  Video-Visit Details    Type of service:  Video Visit    Video End Time:3:28 PM    Originating Location (pt. Location): Home    Distant Location (provider location):  [facility]OLOG[facility] [place]     Platform used for Video Visit: Doximmelly        Again, thank you for allowing me to participate in the care of your patient.      Sincerely,    Jhonny Martin MD

## 2021-03-25 NOTE — PATIENT INSTRUCTIONS
Please start Rosuvastatin 5 mg, 1 tablet daily.  Please report to the lab for blood and urine tests.  Please try to get the COVID vaccine.  It would be best to get the vaccine before with start treatment for Membranous Nephropathy

## 2021-03-26 ENCOUNTER — OFFICE VISIT (OUTPATIENT)
Dept: OBGYN | Facility: CLINIC | Age: 71
End: 2021-03-26
Payer: COMMERCIAL

## 2021-03-26 VITALS
DIASTOLIC BLOOD PRESSURE: 84 MMHG | HEART RATE: 90 BPM | WEIGHT: 215 LBS | HEIGHT: 62 IN | SYSTOLIC BLOOD PRESSURE: 152 MMHG | BODY MASS INDEX: 39.56 KG/M2

## 2021-03-26 DIAGNOSIS — N95.0 POSTMENOPAUSAL BLEEDING: Primary | ICD-10-CM

## 2021-03-26 PROCEDURE — 88305 TISSUE EXAM BY PATHOLOGIST: CPT | Mod: TC | Performed by: STUDENT IN AN ORGANIZED HEALTH CARE EDUCATION/TRAINING PROGRAM

## 2021-03-26 PROCEDURE — 57505 ENDOCERVICAL CURETTAGE: CPT | Performed by: STUDENT IN AN ORGANIZED HEALTH CARE EDUCATION/TRAINING PROGRAM

## 2021-03-26 PROCEDURE — 99203 OFFICE O/P NEW LOW 30 MIN: CPT | Mod: 25 | Performed by: OBSTETRICS & GYNECOLOGY

## 2021-03-26 PROCEDURE — 88305 TISSUE EXAM BY PATHOLOGIST: CPT | Mod: 26 | Performed by: PATHOLOGY

## 2021-03-26 PROCEDURE — G0463 HOSPITAL OUTPT CLINIC VISIT: HCPCS | Mod: 25

## 2021-03-26 PROCEDURE — 57505 ENDOCERVICAL CURETTAGE: CPT | Mod: GC | Performed by: OBSTETRICS & GYNECOLOGY

## 2021-03-26 RX ORDER — ESTRADIOL 0.1 MG/G
2 CREAM VAGINAL
Qty: 42.5 G | Refills: 3 | Status: SHIPPED | OUTPATIENT
Start: 2021-03-29

## 2021-03-26 ASSESSMENT — MIFFLIN-ST. JEOR: SCORE: 1445.54

## 2021-03-26 NOTE — PROGRESS NOTES
[facility]  Gynecology Visit    Reason for Consult: Postmenopausal bleeding    HPI:    Tricia Dumas is a 71 year old P10 here for postmenopausal bleeding. She is here with her daughter who speaks English. Patient declines . She has a history of PMB that was ultimately treated with a laparoscopic supracervical hysterectomy. Results from this work-up and surgery are not fully available, [facility]    Over the last year or so, she has had return of postmenopausal bleeding. She says it maybe happens about 2 x per month, is usually spotting and associated with mild cramps. She is certain it is vaginal bleeding. She also sometimes has some clear discharge. She is on Coumadin.    GYN History  - LMP: No LMP recorded. Patient has had a hysterectomy.  - Pap Smears: Reports no history of abnormal pap smear   No results found for: PAP    OBHx  OB History   No obstetric history on file.   P10    PMHx:   Past Medical History:   Diagnosis Date     Hypertension      Membranous glomerulonephritis      Nonsenile cataract      Pulmonary embolism (H) 2011    after hysterectomy     Type 2 diabetes mellitus without complications (H)          PSHx:   Kidney biopsy  Cataracts  [facility]     Meds:   Current Outpatient Medications:      estradiol (ESTRACE) 0.1 MG/GM vaginal cream, Place 2 g vaginally twice a week, Disp: 42.5 g, Rfl: 3     acetaminophen (TYLENOL) 500 MG tablet, Take 1 tablet by mouth every 6 hours as needed., Disp: , Rfl:      calcium-vitamin D (CALTRATE) 600-400 MG-UNIT per tablet, Take 1 tablet by mouth 2 times daily, Disp: , Rfl:      carvedilol (COREG) 12.5 MG tablet, Take 1 tablet (12.5 mg) by mouth 2 times daily (Patient not taking: Reported on 9/5/2017), Disp: 60 tablet, Rfl: 6     glipiZIDE (GLUCOTROL) 5 MG tablet, Take 5 mg by mouth 2 times daily, Disp: , Rfl:      hydrochlorothiazide (HYDRODIURIL) 25 MG tablet, Take 25 mg by mouth daily, Disp: , Rfl:      ketoconazole  (NIZORAL) 2 % cream, Apply topically daily, Disp: , Rfl:      losartan (COZAAR) 100 MG tablet, Take 1 tablet (100 mg) by mouth daily, Disp: 90 tablet, Rfl: 1     Multiple Vitamin (MULTIVITAMIN PO), Take 1 tablet by mouth daily, Disp: , Rfl:      omeprazole (PRILOSEC) 20 MG capsule, Take 20 mg by mouth 2 times daily , Disp: 90 capsule, Rfl: 3     rosuvastatin (CRESTOR) 10 MG tablet, Take 1 tablet (10 mg) by mouth daily (Patient not taking: Reported on 3/25/2021), Disp: 60 tablet, Rfl: 1     rosuvastatin (CRESTOR) 5 MG tablet, Take 1 tablet (5 mg) by mouth daily, Disp: 30 tablet, Rfl: 11     sitagliptin (JANUVIA) 25 MG tablet, Take 25 mg by mouth daily, Disp: , Rfl:      warfarin (COUMADIN) 2.5 MG tablet, Take 2.5 mg by mouth daily Take 2 tabs three days per week and 3 tabs four days per week, Disp: 90 tablet, Rfl: 2    Allergies:   No Known Allergies      SocHx:   Social History     Tobacco Use     Smoking status: Never Smoker     Smokeless tobacco: Never Used   Substance Use Topics     Alcohol use: Never     Frequency: Never     Binge frequency: Never     Drug use: Never         FamHx:    Family History   Problem Relation Age of Onset     Glaucoma No family hx of      Macular Degeneration No family hx of      Denies family hx of ovarian, uterine, breast, colon cancer.    ROS: 10-Point ROS negative except as noted in HPI    Imaging:  EXAM: US PELVIC COMPLETE W EV  LOCATION:  [facility]  DATE/TIME: 11/19/2020 12:00 PM    INDICATION: Postmenopausal bleeding.  COMPARISON: 11/01/2018  TECHNIQUE: Transabdominal scans were performed. Endovaginal ultrasound was performed to better visualize the adnexa. Color flow with spectral Doppler and waveform analysis performed.    FINDINGS:    UTERUS: Hysterectomy. Cervical cuff remains in place with a few new both thin cysts.    RIGHT OVARY: Obscured by bowel gas.    LEFT OVARY: 1.9 x 1.8 x 0.9 cm. Normal with arterial and venous duplex flow identified.    No significant free  "fluid.    IMPRESSION:    1.  Previous hysterectomy.  2.  Right ovary obscured by bowel gas.  3.  Normal left ovary.    Physical Exam  BP (!) 152/84   Pulse 90   Ht 1.562 m (5' 1.5\")   Wt 97.5 kg (215 lb)   BMI 39.97 kg/m    Gen: Well-appearing, NAD  HEENT: Normocephalic, atraumatic  CV: regular rate  Resp: normal respiratory effort     Pelvic:  Normal appearing external female genitalia. Normal hair distribution. Vagina is without lesions. Vagina atrophic, mild bleeding from speculum exam. Cervix small, flush. External os with hyperemic/vascular appearance. Endocervical curettage performed along with attempted cervical polypectomy with packing forceps, though with minimal return of tissue. Silver nitrate was used to cauterize this area.     Assessment/Plan:  Tricia Dumas is a 71 year old P10 s/p supracervical hysterectomy here with postmenopausal bleeding. Differential includes vaginal atrophy, endometrium left at cervix, cervical precancer or cancer. ECC was obtained to sample os. Normal appearance of ectocervix and long history of normal pap smears, so low suspicion of cervical dysplasia or cancer. More likely to be scant atrophic endometrium or small cervical polyp complicated by anticoagulation. As she previously had these symptoms and uterine pathology was benign, also have low suspicion for endometrial cancer. Cauterization with silver nitrate over this area hopefully will help with symptoms. As there is also likely a component of vaginal atrophy, estrace cream prescribed.    Return to clinic in 6 weeks for follow-up. Phone call to daughter [phone] with results.    Staffed with Dr. Rashaad Edgar MD  Ob/Gyn, PGY-4  3/26/2021, 2:38 PM    I was present with the resident Dr. Edgar for the exam and ECC.  I agree with the note above.  Skylar Neil MD   "

## 2021-03-26 NOTE — PATIENT INSTRUCTIONS
Follow up in 6-8 weeks.     Use vaginal estrogen cream 2 times per week.    We will call you with results.

## 2021-03-30 LAB — COPATH REPORT: NORMAL

## 2021-04-18 ENCOUNTER — HEALTH MAINTENANCE LETTER (OUTPATIENT)
Age: 71
End: 2021-04-18

## 2021-04-21 ENCOUNTER — AMBULATORY - HEALTHEAST (OUTPATIENT)
Dept: LAB | Facility: HOSPITAL | Age: 71
End: 2021-04-21

## 2021-04-21 DIAGNOSIS — N02.2 RECURRENT AND PERSISTENT HEMATURIA WITH DIFFUSE MEMBRANOUS GLOMERULONEPHRITIS: ICD-10-CM

## 2021-04-21 LAB
ALBUMIN SERPL-MCNC: 2.9 G/DL (ref 3.5–5)
ALP SERPL-CCNC: 65 U/L (ref 45–120)
ALT SERPL W P-5'-P-CCNC: 14 U/L (ref 0–45)
ANION GAP SERPL CALCULATED.3IONS-SCNC: 11 MMOL/L (ref 5–18)
AST SERPL W P-5'-P-CCNC: 17 U/L (ref 0–40)
BASOPHILS # BLD AUTO: 0 THOU/UL (ref 0–0.2)
BASOPHILS NFR BLD AUTO: 0 % (ref 0–2)
BILIRUB SERPL-MCNC: 0.4 MG/DL (ref 0–1)
BUN SERPL-MCNC: 21 MG/DL (ref 8–22)
CALCIUM SERPL-MCNC: 8.6 MG/DL (ref 8.5–10.5)
CHLORIDE BLD-SCNC: 97 MMOL/L (ref 98–107)
CO2 SERPL-SCNC: 26 MMOL/L (ref 22–31)
CREAT SERPL-MCNC: 0.88 MG/DL (ref 0.6–1.1)
CREAT UR-MCNC: 59 MG/DL
EOSINOPHIL # BLD AUTO: 0.1 THOU/UL (ref 0–0.4)
EOSINOPHIL NFR BLD AUTO: 2 % (ref 0–6)
ERYTHROCYTE [DISTWIDTH] IN BLOOD BY AUTOMATED COUNT: 13 % (ref 11–14.5)
GFR SERPL CREATININE-BSD FRML MDRD: >60 ML/MIN/1.73M2
GLUCOSE BLD-MCNC: 189 MG/DL (ref 70–125)
HBA1C MFR BLD: 7.6 %
HCT VFR BLD AUTO: 38.1 % (ref 35–47)
HGB BLD-MCNC: 12.9 G/DL (ref 12–16)
IMM GRANULOCYTES # BLD: 0 THOU/UL
IMM GRANULOCYTES NFR BLD: 0 %
LYMPHOCYTES # BLD AUTO: 1.5 THOU/UL (ref 0.8–4.4)
LYMPHOCYTES NFR BLD AUTO: 33 % (ref 20–40)
MCH RBC QN AUTO: 30 PG (ref 27–34)
MCHC RBC AUTO-ENTMCNC: 33.9 G/DL (ref 32–36)
MCV RBC AUTO: 89 FL (ref 80–100)
MONOCYTES # BLD AUTO: 0.3 THOU/UL (ref 0–0.9)
MONOCYTES NFR BLD AUTO: 7 % (ref 2–10)
NEUTROPHILS # BLD AUTO: 2.6 THOU/UL (ref 2–7.7)
NEUTROPHILS NFR BLD AUTO: 58 % (ref 50–70)
PLATELET # BLD AUTO: 268 THOU/UL (ref 140–440)
PMV BLD AUTO: 11.1 FL (ref 8.5–12.5)
POTASSIUM BLD-SCNC: 3.8 MMOL/L (ref 3.5–5)
PROT SERPL-MCNC: 6.9 G/DL (ref 6–8)
PROTEIN, RANDOM URINE - HISTORICAL: 227 MG/DL
PROTEIN/CREAT RATIO, RANDOM UR: 3.85
RBC # BLD AUTO: 4.3 MILL/UL (ref 3.8–5.4)
SODIUM SERPL-SCNC: 134 MMOL/L (ref 136–145)
WBC: 4.5 THOU/UL (ref 4–11)

## 2021-04-22 LAB
HBV SURFACE AG SERPL QL IA: NEGATIVE
HEPATITIS B SURFACE ANTIBODY LHE- HISTORICAL: NEGATIVE

## 2021-04-23 LAB — HBV CORE IGM SERPL QL IA: NEGATIVE

## 2021-04-26 LAB
MISCELLANEOUS TEST DEPT. - HE HISTORICAL: NORMAL
PERFORMING LAB: NORMAL
SPECIMEN STATUS: NORMAL
TEST NAME: NORMAL

## 2021-05-20 ENCOUNTER — OFFICE VISIT (OUTPATIENT)
Dept: OBGYN | Facility: CLINIC | Age: 71
End: 2021-05-20
Attending: OBSTETRICS & GYNECOLOGY
Payer: COMMERCIAL

## 2021-05-20 VITALS
WEIGHT: 213 LBS | HEART RATE: 80 BPM | DIASTOLIC BLOOD PRESSURE: 80 MMHG | SYSTOLIC BLOOD PRESSURE: 127 MMHG | BODY MASS INDEX: 39.59 KG/M2

## 2021-05-20 DIAGNOSIS — N95.0 POSTMENOPAUSAL BLEEDING: Primary | ICD-10-CM

## 2021-05-20 PROCEDURE — G0463 HOSPITAL OUTPT CLINIC VISIT: HCPCS

## 2021-05-20 PROCEDURE — 99213 OFFICE O/P EST LOW 20 MIN: CPT | Performed by: OBSTETRICS & GYNECOLOGY

## 2021-05-20 ASSESSMENT — ANXIETY QUESTIONNAIRES
5. BEING SO RESTLESS THAT IT IS HARD TO SIT STILL: NOT AT ALL
6. BECOMING EASILY ANNOYED OR IRRITABLE: NOT AT ALL
2. NOT BEING ABLE TO STOP OR CONTROL WORRYING: NOT AT ALL
GAD7 TOTAL SCORE: 0
3. WORRYING TOO MUCH ABOUT DIFFERENT THINGS: NOT AT ALL
7. FEELING AFRAID AS IF SOMETHING AWFUL MIGHT HAPPEN: NOT AT ALL
1. FEELING NERVOUS, ANXIOUS, OR ON EDGE: NOT AT ALL

## 2021-05-20 ASSESSMENT — PAIN SCALES - GENERAL: PAINLEVEL: NO PAIN (0)

## 2021-05-20 ASSESSMENT — PATIENT HEALTH QUESTIONNAIRE - PHQ9
5. POOR APPETITE OR OVEREATING: NOT AT ALL
SUM OF ALL RESPONSES TO PHQ QUESTIONS 1-9: 0

## 2021-05-20 NOTE — LETTER
5/20/2021     RE: Tricia Dumas  [address]  [facility] [address]     Dear Colleague,    Thank you for referring your patient, Tricia Dumas, to the [facility] [place] at [facility]. Please see a copy of my visit note below.    Tricia returns for follow-up of postmenopausal bleeding. She returns with her daughter who also acts as  and she declines and ipad .  Please see details of her history from Dr. Edgar' note on March 26, 2021.  Her PMB was thought likely related to a cervical polyp which was removed and found to be benign and vaginal atrophy.  She was prescribed Estrace cream.  She states that she had bleeding for a bout 5 days after the polyp removal.  She started using Estrace during that time, but then stopped it when the bleeding stopped.  She then started having some bleeding again and she restarted the Estrace.  She has had no vaginal bleeding for the last 10 days.  She is tolerating the vaginal cream and has no concerns.    O:  /80   Pulse 80   Wt 96.6 kg (213 lb)   Breastfeeding No   BMI 39.59 kg/m    Gen'l: appears well in no distress  Pelvic exam deferred.    Pathology:  ENDOCERVIX, CURETTAGE:   - Cervical mucosal fragments with no significant histologic abnormality     Assessment/Plan:  70 yo P10 with postmenopausal bleeding  Postmenopausal vaginal atrophy    - discussed negative biopsy results  - encouraged regular vaginal estrogen use, twice weekly.  Will evaluate how she is doing and confirm that she has had no further PMB in 3 months.  Could consider stopping at that point if she desires or we discussed the safety of continuing indefinitely.  Patient is ok with this plan.  Skylar Neil MD

## 2021-05-20 NOTE — NURSING NOTE
Chief Complaint   Patient presents with     RECHECK     Post menopausal bleeding   aHnna Mejia LPN

## 2021-05-20 NOTE — PROGRESS NOTES
Tricia returns for follow-up of postmenopausal bleeding. She returns with her daughter who also acts as  and she declines and ipad .  Please see details of her history from Dr. Edgar' note on March 26, 2021.  Her PMB was thought likely related to a cervical polyp which was removed and found to be benign and vaginal atrophy.  She was prescribed Estrace cream.  She states that she had bleeding for a bout 5 days after the polyp removal.  She started using Estrace during that time, but then stopped it when the bleeding stopped.  She then started having some bleeding again and she restarted the Estrace.  She has had no vaginal bleeding for the last 10 days.  She is tolerating the vaginal cream and has no concerns.    O:  /80   Pulse 80   Wt 96.6 kg (213 lb)   Breastfeeding No   BMI 39.59 kg/m    Gen'l: appears well in no distress  Pelvic exam deferred.    Pathology:  ENDOCERVIX, CURETTAGE:   - Cervical mucosal fragments with no significant histologic abnormality     Assessment/Plan:  70 yo P10 with postmenopausal bleeding  Postmenopausal vaginal atrophy    - discussed negative biopsy results  - encouraged regular vaginal estrogen use, twice weekly.  Will evaluate how she is doing and confirm that she has had no further PMB in 3 months.  Could consider stopping at that point if she desires or we discussed the safety of continuing indefinitely.  Patient is ok with this plan.    Skylar Neil MD

## 2021-05-21 ASSESSMENT — ANXIETY QUESTIONNAIRES: GAD7 TOTAL SCORE: 0

## 2021-06-01 ENCOUNTER — RECORDS - HEALTHEAST (OUTPATIENT)
Dept: ADMINISTRATIVE | Facility: CLINIC | Age: 71
End: 2021-06-01

## 2021-06-02 ENCOUNTER — RECORDS - HEALTHEAST (OUTPATIENT)
Dept: ADMINISTRATIVE | Facility: CLINIC | Age: 71
End: 2021-06-02

## 2021-06-03 ENCOUNTER — RECORDS - HEALTHEAST (OUTPATIENT)
Dept: ADMINISTRATIVE | Facility: CLINIC | Age: 71
End: 2021-06-03

## 2021-07-02 ENCOUNTER — TELEPHONE (OUTPATIENT)
Dept: OBGYN | Facility: CLINIC | Age: 71
End: 2021-07-02

## 2021-07-02 NOTE — TELEPHONE ENCOUNTER
----- Message from Cadence Ye RN sent at 7/2/2021  7:45 AM CDT -----  Regarding: Estrogen PA  Patient's daughter called, states that insurance won't cover pt's estrogen cream and pharmacy told her to contact clinic for PA.

## 2021-07-02 NOTE — TELEPHONE ENCOUNTER
Called Tricia's daughter and advised that we were notified by pharmacy and PA process has been initiated.  Will call her with determination

## 2021-07-08 ENCOUNTER — TELEPHONE (OUTPATIENT)
Dept: OBGYN | Facility: CLINIC | Age: 71
End: 2021-07-08

## 2021-07-08 ENCOUNTER — APPOINTMENT (OUTPATIENT)
Dept: INTERPRETER SERVICES | Facility: CLINIC | Age: 71
End: 2021-07-08
Payer: COMMERCIAL

## 2021-07-08 DIAGNOSIS — N95.0 POSTMENOPAUSAL BLEEDING: Primary | ICD-10-CM

## 2021-07-08 NOTE — TELEPHONE ENCOUNTER
Central Prior Authorization Team   Phone: [phone]      PA Initiation - Patient's primary plan is [employer] Part D    Medication: estradiol (ESTRACE) 0.1 MG/GM vaginal cream  Insurance Company: [employer] ([facility]) - Phone [phone] Fax [phone]  Pharmacy Filling the Rx: [id number] - [place], [place] - [address]  Filling Pharmacy Phone: [phone]  Filling Pharmacy Fax:    Start Date: 7/8/2021

## 2021-07-08 NOTE — TELEPHONE ENCOUNTER
I left message with patient to seei f she would like to have estriol 0.1mg/1 gm  From [facility] pharmacy- oked per Rashaad alves  Verbal today. Is patient calls back and wants this please add medication to her list and fax order to mix in pharmacy [phone] phone [phone]            PRIOR AUTHORIZATION DENIED    Medication: estradiol (ESTRACE) 0.1 MG/GM vaginal cream    Denial Date: 7/8/2021    Denial Rational:           Appeal Information:

## 2021-07-08 NOTE — TELEPHONE ENCOUNTER
Prior Authorization Retail Medication Request    Medication/Dose: estradiol 0.1 mg/g,m  Apply 2gram vaginally twice a week  ICD code (if different than what is on RX):  N95.0  Post menopausal bleeding  Previously Tried and Failed:  Patient stated that she used estorgen in the past.  Needing to use it twice a week until August  That is when her appointment is for reevaulation  Rationale:  Post manopausal bleeding    Insurance Name:  [employer]  Insurance ID:  [id number]      Pharmacy Information (if different than what is on RX)  Name:  [facility]  Phone:  [phone]

## 2021-08-19 ENCOUNTER — VIRTUAL VISIT (OUTPATIENT)
Dept: OBGYN | Facility: CLINIC | Age: 71
End: 2021-08-19
Attending: OBSTETRICS & GYNECOLOGY
Payer: COMMERCIAL

## 2021-08-19 ENCOUNTER — TELEPHONE (OUTPATIENT)
Dept: OBGYN | Facility: CLINIC | Age: 71
End: 2021-08-19

## 2021-08-19 DIAGNOSIS — N95.2 VAGINAL ATROPHY: Primary | ICD-10-CM

## 2021-08-19 DIAGNOSIS — N95.0 POSTMENOPAUSAL BLEEDING: ICD-10-CM

## 2021-08-19 PROCEDURE — 99214 OFFICE O/P EST MOD 30 MIN: CPT | Mod: 95 | Performed by: OBSTETRICS & GYNECOLOGY

## 2021-08-19 NOTE — TELEPHONE ENCOUNTER
Prior Authorization Retail Medication Request    Medication/Dose: estradiol (ESTRACE) 0.1 MG/GM vaginal cream 42.5 g      ICD code (if different than what is on RX):  N95.0 & N95.2    Pharmacy Information (if different than what is on RX)  Name:  [id number]  Phone:  [phone]

## 2021-08-19 NOTE — LETTER
"8/19/2021       RE: Tricia Dumas  [address]  [facility] [address]     Dear Colleague,    Thank you for referring your patient, Tricia Dumas, to the [facility] [place] at [facility]. Please see a copy of my visit note below.    The patient has been notified of the following:      \"We have found that certain health care needs can be provided without the need for a face to face visit.  This service lets us provide the care you need with a phone conversation.       I will have full access to your [place] medical record during this entire phone call.   I will be taking notes for your medical record.      Since this is like an office visit, we will bill your insurance company for this service.       There are potential benefits and risks of telephone visits (e.g. limits to patient confidentiality) that differ from in-person visits.?  Confidentiality still applies for telephone services, and nobody will record the visit.  It is important to be in a quiet, private space that is free of distractions (including cell phone or other devices) during the visit.??      If during the course of the call I believe a telephone visit is not appropriate, you will not be charged for this service\"     Consent has been obtained for this service by care team member: Yes     Tricia presents for telephone visit today. A [employer]  was used for this visit,  # [id number].  The patient's daughter was present on the call as well and provided some of the history.  The patient gave consent for this.    Tricia was seen in clinic in March and May for episodes of PMB.  She is s/p laparoscopic supracervical hysterectomy.  She is on coumadin for a history of a pulmonary embolism after her hysterectomy.  On prior exams a cervical polyp was found and removed with a benign pathology report.  She was started on vaginal estrogen for management of vaginal " atrophy which was thought to contribute to the PMB as well.  Initially there was trouble filling the medication because it was not covered by insurance.  We then switched to a compounded medication; however, per Evelyn's daughter this medication was no cheaper and in the end her daughter just covered the cost of the medication.  Since using this medication she has had no further episodes of vaginal bleeding.  She is using it twice weekly.  She has no concerns except for the cost if she needs to continue this for a long time.    Tricia continues to see her primary care physician for INR checks.  She has noted no side effects from the vaginal estrace cream and is fine continuing this.    Assessment/Plan:  70 yo with PMB after hysterectomy secondary to vaginal atrophy and cervical polyp  - continue Estrace cream.    - Will check status of the appeal as this medication is indicated for the management of PMB.  - follow-up in 1 year if continues to go well.    Telephone time: 15 minutes    On the day of this encounter at least 30 minutes of time was spent in consultation with telphone discussion, review of historical information, documentation and post visit activities including communication with other providers and coordination of patient care.  Skylar Neil MD

## 2021-08-19 NOTE — PROGRESS NOTES
"The patient has been notified of the following:      \"We have found that certain health care needs can be provided without the need for a face to face visit.  This service lets us provide the care you need with a phone conversation.       I will have full access to your [place] medical record during this entire phone call.   I will be taking notes for your medical record.      Since this is like an office visit, we will bill your insurance company for this service.       There are potential benefits and risks of telephone visits (e.g. limits to patient confidentiality) that differ from in-person visits.?  Confidentiality still applies for telephone services, and nobody will record the visit.  It is important to be in a quiet, private space that is free of distractions (including cell phone or other devices) during the visit.??      If during the course of the call I believe a telephone visit is not appropriate, you will not be charged for this service\"     Consent has been obtained for this service by care team member: Yes     Tricia presents for telephone visit today. A [employer]  was used for this visit,  # [id number].  The patient's daughter was present on the call as well and provided some of the history.  The patient gave consent for this.    Tricia was seen in clinic in March and May for episodes of PMB.  She is s/p laparoscopic supracervical hysterectomy.  She is on coumadin for a history of a pulmonary embolism after her hysterectomy.  On prior exams a cervical polyp was found and removed with a benign pathology report.  She was started on vaginal estrogen for management of vaginal atrophy which was thought to contribute to the PMB as well.  Initially there was trouble filling the medication because it was not covered by insurance.  We then switched to a compounded medication; however, per Evelyn's daughter this medication was no cheaper and in the end her daughter just covered the cost of the " medication.  Since using this medication she has had no further episodes of vaginal bleeding.  She is using it twice weekly.  She has no concerns except for the cost if she needs to continue this for a long time.    Tricia continues to see her primary care physician for INR checks.  She has noted no side effects from the vaginal estrace cream and is fine continuing this.    Assessment/Plan:  70 yo with PMB after hysterectomy secondary to vaginal atrophy and cervical polyp  - continue Estrace cream.    - Will check status of the appeal as this medication is indicated for the management of PMB.  - follow-up in 1 year if continues to go well.    Telephone time: 15 minutes    On the day of this encounter at least 30 minutes of time was spent in consultation with telphone discussion, review of historical information, documentation and post visit activities including communication with other providers and coordination of patient care.  Skylar Neil MD

## 2021-10-02 ENCOUNTER — HEALTH MAINTENANCE LETTER (OUTPATIENT)
Age: 71
End: 2021-10-02

## 2021-11-27 ENCOUNTER — HEALTH MAINTENANCE LETTER (OUTPATIENT)
Age: 71
End: 2021-11-27

## 2022-03-19 ENCOUNTER — HEALTH MAINTENANCE LETTER (OUTPATIENT)
Age: 72
End: 2022-03-19

## 2022-05-14 ENCOUNTER — HEALTH MAINTENANCE LETTER (OUTPATIENT)
Age: 72
End: 2022-05-14

## 2022-07-09 ENCOUNTER — HEALTH MAINTENANCE LETTER (OUTPATIENT)
Age: 72
End: 2022-07-09

## 2022-09-03 ENCOUNTER — HEALTH MAINTENANCE LETTER (OUTPATIENT)
Age: 72
End: 2022-09-03

## 2023-01-15 ENCOUNTER — HEALTH MAINTENANCE LETTER (OUTPATIENT)
Age: 73
End: 2023-01-15

## 2023-06-03 ENCOUNTER — HEALTH MAINTENANCE LETTER (OUTPATIENT)
Age: 73
End: 2023-06-03

## 2023-07-22 ENCOUNTER — HEALTH MAINTENANCE LETTER (OUTPATIENT)
Age: 73
End: 2023-07-22

## 2023-11-28 DIAGNOSIS — H40.003 GLAUCOMA SUSPECT OF BOTH EYES: Primary | ICD-10-CM

## 2024-02-17 ENCOUNTER — HEALTH MAINTENANCE LETTER (OUTPATIENT)
Age: 74
End: 2024-02-17

## 2024-04-27 ENCOUNTER — HEALTH MAINTENANCE LETTER (OUTPATIENT)
Age: 74
End: 2024-04-27

## 2024-07-06 ENCOUNTER — HEALTH MAINTENANCE LETTER (OUTPATIENT)
Age: 74
End: 2024-07-06

## 2024-09-14 ENCOUNTER — HEALTH MAINTENANCE LETTER (OUTPATIENT)
Age: 74
End: 2024-09-14

## 2025-01-20 RX ORDER — BRIMONIDINE TARTRATE 2 MG/ML
SOLUTION/ DROPS OPHTHALMIC
Qty: 15 ML | Refills: 0 | OUTPATIENT
Start: 2025-01-20

## 2025-01-20 RX ORDER — DORZOLAMIDE HYDROCHLORIDE AND TIMOLOL MALEATE 20; 5 MG/ML; MG/ML
SOLUTION/ DROPS OPHTHALMIC
Qty: 10 ML | Refills: 0 | OUTPATIENT
Start: 2025-01-20

## 2025-02-19 RX ORDER — DORZOLAMIDE HYDROCHLORIDE AND TIMOLOL MALEATE 20; 5 MG/ML; MG/ML
SOLUTION/ DROPS OPHTHALMIC
Qty: 10 ML | Refills: 0 | OUTPATIENT
Start: 2025-02-19

## 2025-02-19 RX ORDER — LATANOPROST 50 UG/ML
1 SOLUTION/ DROPS OPHTHALMIC AT BEDTIME
Qty: 7.5 ML | Refills: 0 | OUTPATIENT
Start: 2025-02-19

## 2025-02-19 RX ORDER — BRIMONIDINE TARTRATE 2 MG/ML
SOLUTION/ DROPS OPHTHALMIC
Qty: 15 ML | Refills: 0 | OUTPATIENT
Start: 2025-02-19

## 2025-02-19 NOTE — TELEPHONE ENCOUNTER
Dr. Portillo does not see patients at [facility]. [facility] may be location [facility]  BHARATH PORTILLO [address] [place] [address] [phone]    Provider not at [facility]/ patient not seen@ [facility].